# Patient Record
Sex: MALE | NOT HISPANIC OR LATINO | Employment: OTHER | URBAN - METROPOLITAN AREA
[De-identification: names, ages, dates, MRNs, and addresses within clinical notes are randomized per-mention and may not be internally consistent; named-entity substitution may affect disease eponyms.]

---

## 2023-12-26 DIAGNOSIS — Z00.00 HEALTHCARE MAINTENANCE: ICD-10-CM

## 2023-12-26 RX ORDER — DOXAZOSIN 2 MG/1
2 TABLET ORAL DAILY
Qty: 90 TABLET | Refills: 3 | OUTPATIENT
Start: 2023-12-26

## 2023-12-26 RX ORDER — DOXAZOSIN 2 MG/1
2 TABLET ORAL DAILY
COMMUNITY
End: 2024-01-24 | Stop reason: SDUPTHER

## 2024-01-24 ENCOUNTER — LAB (OUTPATIENT)
Dept: LAB | Facility: LAB | Age: 69
End: 2024-01-24
Payer: MEDICARE

## 2024-01-24 ENCOUNTER — OFFICE VISIT (OUTPATIENT)
Dept: PRIMARY CARE | Facility: CLINIC | Age: 69
End: 2024-01-24
Payer: MEDICARE

## 2024-01-24 VITALS
OXYGEN SATURATION: 95 % | WEIGHT: 241 LBS | HEIGHT: 75 IN | TEMPERATURE: 97.3 F | HEART RATE: 76 BPM | SYSTOLIC BLOOD PRESSURE: 125 MMHG | DIASTOLIC BLOOD PRESSURE: 79 MMHG | BODY MASS INDEX: 29.97 KG/M2

## 2024-01-24 DIAGNOSIS — R35.0 BENIGN PROSTATIC HYPERPLASIA WITH URINARY FREQUENCY: ICD-10-CM

## 2024-01-24 DIAGNOSIS — N40.1 BENIGN PROSTATIC HYPERPLASIA WITH URINARY FREQUENCY: ICD-10-CM

## 2024-01-24 DIAGNOSIS — Z00.00 HEALTHCARE MAINTENANCE: Primary | ICD-10-CM

## 2024-01-24 DIAGNOSIS — Z12.5 ENCOUNTER FOR SCREENING FOR MALIGNANT NEOPLASM OF PROSTATE: ICD-10-CM

## 2024-01-24 DIAGNOSIS — R73.03 PREDIABETES: ICD-10-CM

## 2024-01-24 DIAGNOSIS — Z12.11 ENCOUNTER FOR SCREENING FOR MALIGNANT NEOPLASM OF COLON: ICD-10-CM

## 2024-01-24 DIAGNOSIS — Z00.00 MEDICARE ANNUAL WELLNESS VISIT, SUBSEQUENT: ICD-10-CM

## 2024-01-24 DIAGNOSIS — E66.9 OBESITY (BMI 30-39.9): ICD-10-CM

## 2024-01-24 DIAGNOSIS — Z00.00 HEALTHCARE MAINTENANCE: ICD-10-CM

## 2024-01-24 PROBLEM — N40.0 BENIGN PROSTATIC HYPERPLASIA: Status: ACTIVE | Noted: 2024-01-24

## 2024-01-24 LAB
ALBUMIN SERPL BCP-MCNC: 4.1 G/DL (ref 3.4–5)
ALP SERPL-CCNC: 44 U/L (ref 33–136)
ALT SERPL W P-5'-P-CCNC: 11 U/L (ref 10–52)
ANION GAP SERPL CALC-SCNC: 13 MMOL/L (ref 10–20)
AST SERPL W P-5'-P-CCNC: 21 U/L (ref 9–39)
BASOPHILS # BLD AUTO: 0.03 X10*3/UL (ref 0–0.1)
BASOPHILS NFR BLD AUTO: 0.6 %
BILIRUB SERPL-MCNC: 0.7 MG/DL (ref 0–1.2)
BUN SERPL-MCNC: 17 MG/DL (ref 6–23)
CALCIUM SERPL-MCNC: 9.3 MG/DL (ref 8.6–10.6)
CHLORIDE SERPL-SCNC: 106 MMOL/L (ref 98–107)
CHOLEST SERPL-MCNC: 147 MG/DL (ref 0–199)
CHOLESTEROL/HDL RATIO: 2.6
CO2 SERPL-SCNC: 24 MMOL/L (ref 21–32)
CREAT SERPL-MCNC: 1.49 MG/DL (ref 0.5–1.3)
EGFRCR SERPLBLD CKD-EPI 2021: 51 ML/MIN/1.73M*2
EOSINOPHIL # BLD AUTO: 0.05 X10*3/UL (ref 0–0.7)
EOSINOPHIL NFR BLD AUTO: 0.9 %
ERYTHROCYTE [DISTWIDTH] IN BLOOD BY AUTOMATED COUNT: 13.3 % (ref 11.5–14.5)
EST. AVERAGE GLUCOSE BLD GHB EST-MCNC: 105 MG/DL
GLUCOSE SERPL-MCNC: 80 MG/DL (ref 74–99)
HBA1C MFR BLD: 5.3 %
HCT VFR BLD AUTO: 44.6 % (ref 41–52)
HDLC SERPL-MCNC: 56.3 MG/DL
HGB BLD-MCNC: 14.7 G/DL (ref 13.5–17.5)
IMM GRANULOCYTES # BLD AUTO: 0.01 X10*3/UL (ref 0–0.7)
IMM GRANULOCYTES NFR BLD AUTO: 0.2 % (ref 0–0.9)
LDLC SERPL CALC-MCNC: 81 MG/DL
LYMPHOCYTES # BLD AUTO: 2.04 X10*3/UL (ref 1.2–4.8)
LYMPHOCYTES NFR BLD AUTO: 37.8 %
MCH RBC QN AUTO: 29.7 PG (ref 26–34)
MCHC RBC AUTO-ENTMCNC: 33 G/DL (ref 32–36)
MCV RBC AUTO: 90 FL (ref 80–100)
MONOCYTES # BLD AUTO: 0.31 X10*3/UL (ref 0.1–1)
MONOCYTES NFR BLD AUTO: 5.8 %
NEUTROPHILS # BLD AUTO: 2.95 X10*3/UL (ref 1.2–7.7)
NEUTROPHILS NFR BLD AUTO: 54.7 %
NON HDL CHOLESTEROL: 91 MG/DL (ref 0–149)
NRBC BLD-RTO: 0 /100 WBCS (ref 0–0)
PLATELET # BLD AUTO: 176 X10*3/UL (ref 150–450)
POTASSIUM SERPL-SCNC: 4.2 MMOL/L (ref 3.5–5.3)
PROT SERPL-MCNC: 6.9 G/DL (ref 6.4–8.2)
PSA SERPL-MCNC: 0.84 NG/ML
RBC # BLD AUTO: 4.95 X10*6/UL (ref 4.5–5.9)
SODIUM SERPL-SCNC: 139 MMOL/L (ref 136–145)
TRIGL SERPL-MCNC: 50 MG/DL (ref 0–149)
VLDL: 10 MG/DL (ref 0–40)
WBC # BLD AUTO: 5.4 X10*3/UL (ref 4.4–11.3)

## 2024-01-24 PROCEDURE — 80061 LIPID PANEL: CPT

## 2024-01-24 PROCEDURE — 36415 COLL VENOUS BLD VENIPUNCTURE: CPT

## 2024-01-24 PROCEDURE — 85025 COMPLETE CBC W/AUTO DIFF WBC: CPT

## 2024-01-24 PROCEDURE — 80053 COMPREHEN METABOLIC PANEL: CPT

## 2024-01-24 PROCEDURE — 1036F TOBACCO NON-USER: CPT | Performed by: STUDENT IN AN ORGANIZED HEALTH CARE EDUCATION/TRAINING PROGRAM

## 2024-01-24 PROCEDURE — G0103 PSA SCREENING: HCPCS

## 2024-01-24 PROCEDURE — G0439 PPPS, SUBSEQ VISIT: HCPCS | Performed by: STUDENT IN AN ORGANIZED HEALTH CARE EDUCATION/TRAINING PROGRAM

## 2024-01-24 PROCEDURE — 1170F FXNL STATUS ASSESSED: CPT | Performed by: STUDENT IN AN ORGANIZED HEALTH CARE EDUCATION/TRAINING PROGRAM

## 2024-01-24 PROCEDURE — 1123F ACP DISCUSS/DSCN MKR DOCD: CPT | Performed by: STUDENT IN AN ORGANIZED HEALTH CARE EDUCATION/TRAINING PROGRAM

## 2024-01-24 PROCEDURE — 1158F ADVNC CARE PLAN TLK DOCD: CPT | Performed by: STUDENT IN AN ORGANIZED HEALTH CARE EDUCATION/TRAINING PROGRAM

## 2024-01-24 PROCEDURE — 1159F MED LIST DOCD IN RCRD: CPT | Performed by: STUDENT IN AN ORGANIZED HEALTH CARE EDUCATION/TRAINING PROGRAM

## 2024-01-24 PROCEDURE — 99397 PER PM REEVAL EST PAT 65+ YR: CPT | Performed by: STUDENT IN AN ORGANIZED HEALTH CARE EDUCATION/TRAINING PROGRAM

## 2024-01-24 PROCEDURE — 83036 HEMOGLOBIN GLYCOSYLATED A1C: CPT

## 2024-01-24 RX ORDER — DOXAZOSIN 2 MG/1
2 TABLET ORAL DAILY
Qty: 90 TABLET | Refills: 3 | Status: SHIPPED | OUTPATIENT
Start: 2024-01-24

## 2024-01-24 ASSESSMENT — ACTIVITIES OF DAILY LIVING (ADL)
DRESSING: INDEPENDENT
MANAGING_FINANCES: INDEPENDENT
BATHING: INDEPENDENT
TAKING_MEDICATION: INDEPENDENT
DOING_HOUSEWORK: INDEPENDENT
GROCERY_SHOPPING: INDEPENDENT

## 2024-01-24 ASSESSMENT — PATIENT HEALTH QUESTIONNAIRE - PHQ9
SUM OF ALL RESPONSES TO PHQ9 QUESTIONS 1 AND 2: 0
2. FEELING DOWN, DEPRESSED OR HOPELESS: NOT AT ALL
1. LITTLE INTEREST OR PLEASURE IN DOING THINGS: NOT AT ALL

## 2024-01-24 NOTE — PROGRESS NOTES
"Subjective   Reason for Visit: John Martin is an 68 y.o. male here for a Medicare Wellness visit.     Past Medical, Surgical, and Family History reviewed and updated in chart.    Reviewed all medications by prescribing practitioner or clinical pharmacist (such as prescriptions, OTCs, herbal therapies and supplements) and documented in the medical record.    HPI    Re: preDM - A1c < 6.5. Patient denies symptomatic highs or lows with regards to their glucose. Reports no polyuria, polydipsia, fatigue, vision changes, and stocking/glove neuropathy. Reports fair foot hygiene, denies knowledge of any foot ulcers or trauma.     Re: BPH - on [place]. Sx controlled. Needs refill.     Re: obesity - BMI >30. Stable. Precontemplative about meaningful change.     Re: HM - Due for colon screen. Due for PSA. Shots UTD.    PMHx, FHx, Social Hx, Surg Hx personally reviewed at this appointment. No pertinent findings and/or changes from prior (if applicable).    ROS: Denies wt gain/loss f/c HA LoC CP SOB NVDC. See HPI above, and scanned sheet (if applicable). All other systems are reviewed and are without complaint.         Patient Care Team:  Turner Colorado MD as PCP - General (Internal Medicine)  Turner Colorado MD as PCP - [employer] PCP     Review of Systems    Objective   Vitals:  /79   Pulse 76   Temp 36.3 °C (97.3 °F)   Ht 1.905 m (6' 3\")   Wt 109 kg (241 lb)   SpO2 95%   BMI 30.12 kg/m²       Physical Exam  Gen: well developed in NAD. AAO x3.  HEENT: NC/AT. Anicteric sclera, symmetric pupils. MMM no thrush.  Neck: Soft, supple. No LAD. No goiter.   CV: RRR nl s1s2 no m/r/g  Pulm: CTAB no w/r/r, good air exchange  GI: soft NTND BS+ no hsm  Ext: WWP no edema  Neuro: II-XII grossly intact, nonfocal systemic findings  MSK: 5/5 strength b/l UE and LE  Gait: unremarkable        Lab Results   Component Value Date    WBC 6.1 12/08/2022    HGB 14.8 12/08/2022    HCT 46.2 12/08/2022     " 12/08/2022    CHOL 173 12/08/2022    TRIG 51 12/08/2022    HDL 70.5 12/08/2022    ALT 15 12/08/2022    AST 22 12/08/2022     12/08/2022    K 4.2 12/08/2022    CL 99 12/08/2022    CREATININE 1.71 (H) 12/08/2022    BUN 20 12/08/2022    CO2 29 12/08/2022    HGBA1C 6.1 03/08/2021     par    ELECTROCARDIOGRAM RHYTHM STRIP  Ordered by an unspecified provider.         Assessment/Plan   Stable health. MWV and CPE completed today.    # Obesity: BMI > 30  - counselled on wt loss via diet, exercise, and other lifestyle modifications     # BPH  - Stable  - continue current medication  - f/u urology     # Health Maintenance  - routine blood work  - Colon Cancer Screening: due, ordered today   - PSA: due, ordered today   - Immunizations: UTD  - AAA screening:  not indicated     Problem List Items Addressed This Visit       Benign prostatic hyperplasia    Relevant Medications    doxazosin (Cardura) 2 mg tablet    Other Relevant Orders    Prostate Specific Antigen, Screen     Other Visit Diagnoses       Healthcare maintenance    -  Primary    Relevant Orders    CBC and Auto Differential    Comprehensive Metabolic Panel    Lipid Panel    Prediabetes        Relevant Orders    CBC and Auto Differential    Comprehensive Metabolic Panel    Lipid Panel    Hemoglobin A1C    Encounter for screening for malignant neoplasm of prostate        Relevant Orders    Prostate Specific Antigen, Screen    Medicare annual wellness visit, subsequent        Creatinine elevation        Relevant Orders    Lipid Panel    Encounter for screening for malignant neoplasm of colon        Relevant Orders    Colonoscopy Screening; Average Risk Patient

## 2024-01-24 NOTE — PATIENT INSTRUCTIONS
Please stop at the lab ([address]) to complete your blood and/or urine work that I've ordered for you.    I will contact you with the results at my soonest convenience. I strongly urge you to use [employer] as this is the quickest and easiest way to access your results and receive my correspondences.    I have renewed your chronic medications today.     I have ordered a screening colonoscopy. Please call [phone] to schedule the appointment. I recommend being the first appointment of the day, if possible.     See me yearly for a complete physical exam, and sooner as needed for sick visits

## 2024-01-25 DIAGNOSIS — Z12.11 COLON CANCER SCREENING: ICD-10-CM

## 2024-01-25 RX ORDER — POLYETHYLENE GLYCOL 3350, SODIUM SULFATE ANHYDROUS, SODIUM BICARBONATE, SODIUM CHLORIDE, POTASSIUM CHLORIDE 236; 22.74; 6.74; 5.86; 2.97 G/4L; G/4L; G/4L; G/4L; G/4L
4000 POWDER, FOR SOLUTION ORAL ONCE
Qty: 4000 ML | Refills: 0 | Status: SHIPPED | OUTPATIENT
Start: 2024-01-25 | End: 2024-01-25

## 2024-03-11 ENCOUNTER — OFFICE VISIT (OUTPATIENT)
Dept: GASTROENTEROLOGY | Facility: EXTERNAL LOCATION | Age: 69
End: 2024-03-11
Payer: MEDICARE

## 2024-03-11 DIAGNOSIS — K64.0 FIRST DEGREE HEMORRHOIDS: ICD-10-CM

## 2024-03-11 DIAGNOSIS — D12.2 BENIGN NEOPLASM OF ASCENDING COLON: ICD-10-CM

## 2024-03-11 DIAGNOSIS — K57.30 DIVERTICULOSIS OF LARGE INTESTINE WITHOUT DIVERTICULITIS: ICD-10-CM

## 2024-03-11 DIAGNOSIS — Z86.010 PERSONAL HISTORY OF COLONIC POLYPS: Primary | ICD-10-CM

## 2024-03-11 DIAGNOSIS — Z12.11 ENCOUNTER FOR SCREENING FOR MALIGNANT NEOPLASM OF COLON: ICD-10-CM

## 2024-03-11 DIAGNOSIS — D12.0 BENIGN NEOPLASM OF CECUM: ICD-10-CM

## 2024-03-11 PROCEDURE — 45385 COLONOSCOPY W/LESION REMOVAL: CPT | Performed by: INTERNAL MEDICINE

## 2024-03-11 PROCEDURE — 88305 TISSUE EXAM BY PATHOLOGIST: CPT | Performed by: PATHOLOGY

## 2024-03-11 PROCEDURE — 0753T DGTZ GLS MCRSCP SLD LEVEL IV: CPT

## 2024-03-11 PROCEDURE — 1036F TOBACCO NON-USER: CPT | Performed by: INTERNAL MEDICINE

## 2024-03-11 PROCEDURE — 1159F MED LIST DOCD IN RCRD: CPT | Performed by: INTERNAL MEDICINE

## 2024-03-11 PROCEDURE — 1160F RVW MEDS BY RX/DR IN RCRD: CPT | Performed by: INTERNAL MEDICINE

## 2024-03-11 NOTE — PROGRESS NOTES
Colonoscopy performed today 3/11/2024 at the [facility] ([facility] [place]  See procedure report(s) under Media tab.

## 2024-03-13 ENCOUNTER — LAB REQUISITION (OUTPATIENT)
Dept: LAB | Facility: HOSPITAL | Age: 69
End: 2024-03-13
Payer: MEDICARE

## 2024-03-19 LAB
LABORATORY COMMENT REPORT: NORMAL
PATH REPORT.FINAL DX SPEC: NORMAL
PATH REPORT.GROSS SPEC: NORMAL
PATH REPORT.RELEVANT HX SPEC: NORMAL
PATH REPORT.TOTAL CANCER: NORMAL

## 2025-01-17 DIAGNOSIS — N40.1 BENIGN PROSTATIC HYPERPLASIA WITH URINARY FREQUENCY: ICD-10-CM

## 2025-01-17 DIAGNOSIS — R35.0 BENIGN PROSTATIC HYPERPLASIA WITH URINARY FREQUENCY: ICD-10-CM

## 2025-01-17 RX ORDER — DOXAZOSIN 2 MG/1
2 TABLET ORAL DAILY
Qty: 90 TABLET | Refills: 3 | Status: SHIPPED | OUTPATIENT
Start: 2025-01-17